# Patient Record
Sex: FEMALE | Race: WHITE | NOT HISPANIC OR LATINO
[De-identification: names, ages, dates, MRNs, and addresses within clinical notes are randomized per-mention and may not be internally consistent; named-entity substitution may affect disease eponyms.]

---

## 2018-06-04 ENCOUNTER — TRANSCRIPTION ENCOUNTER (OUTPATIENT)
Age: 48
End: 2018-06-04

## 2019-05-14 ENCOUNTER — TRANSCRIPTION ENCOUNTER (OUTPATIENT)
Age: 49
End: 2019-05-14

## 2019-06-14 ENCOUNTER — TRANSCRIPTION ENCOUNTER (OUTPATIENT)
Age: 49
End: 2019-06-14

## 2019-12-22 ENCOUNTER — TRANSCRIPTION ENCOUNTER (OUTPATIENT)
Age: 49
End: 2019-12-22

## 2023-09-05 ENCOUNTER — NON-APPOINTMENT (OUTPATIENT)
Age: 53
End: 2023-09-05

## 2023-09-06 ENCOUNTER — APPOINTMENT (OUTPATIENT)
Dept: DERMATOLOGY | Facility: CLINIC | Age: 53
End: 2023-09-06

## 2024-04-27 ENCOUNTER — NON-APPOINTMENT (OUTPATIENT)
Age: 54
End: 2024-04-27

## 2024-04-30 ENCOUNTER — APPOINTMENT (OUTPATIENT)
Dept: ORTHOPEDIC SURGERY | Facility: CLINIC | Age: 54
End: 2024-04-30
Payer: COMMERCIAL

## 2024-04-30 VITALS
BODY MASS INDEX: 23.32 KG/M2 | HEIGHT: 65 IN | DIASTOLIC BLOOD PRESSURE: 92 MMHG | WEIGHT: 140 LBS | SYSTOLIC BLOOD PRESSURE: 148 MMHG | HEART RATE: 60 BPM

## 2024-04-30 DIAGNOSIS — Z86.39 PERSONAL HISTORY OF OTHER ENDOCRINE, NUTRITIONAL AND METABOLIC DISEASE: ICD-10-CM

## 2024-04-30 DIAGNOSIS — Z78.9 OTHER SPECIFIED HEALTH STATUS: ICD-10-CM

## 2024-04-30 DIAGNOSIS — M25.512 PAIN IN LEFT SHOULDER: ICD-10-CM

## 2024-04-30 DIAGNOSIS — M89.8X1 OTHER SPECIFIED DISORDERS OF BONE, SHOULDER: ICD-10-CM

## 2024-04-30 DIAGNOSIS — M54.12 RADICULOPATHY, CERVICAL REGION: ICD-10-CM

## 2024-04-30 PROBLEM — Z00.00 ENCOUNTER FOR PREVENTIVE HEALTH EXAMINATION: Status: ACTIVE | Noted: 2024-04-30

## 2024-04-30 PROCEDURE — 73030 X-RAY EXAM OF SHOULDER: CPT | Mod: LT

## 2024-04-30 PROCEDURE — 99204 OFFICE O/P NEW MOD 45 MIN: CPT

## 2024-04-30 PROCEDURE — 72040 X-RAY EXAM NECK SPINE 2-3 VW: CPT

## 2024-04-30 RX ORDER — METHYLPREDNISOLONE 4 MG/1
4 TABLET ORAL
Qty: 1 | Refills: 1 | Status: ACTIVE | COMMUNITY
Start: 2024-04-30 | End: 1900-01-01

## 2024-04-30 RX ORDER — METHOCARBAMOL 750 MG/1
750 TABLET, FILM COATED ORAL 3 TIMES DAILY
Qty: 30 | Refills: 0 | Status: ACTIVE | COMMUNITY
Start: 2024-04-30 | End: 1900-01-01

## 2024-04-30 RX ORDER — NAPROXEN 500 MG/1
500 TABLET ORAL
Qty: 60 | Refills: 1 | Status: ACTIVE | COMMUNITY
Start: 2024-04-30 | End: 1900-01-01

## 2024-04-30 NOTE — PHYSICAL EXAM
[Normal Finger/nose] : finger to nose coordination [Normal] : no peripheral adenopathy appreciated [de-identified] : Left Shoulder Exam  Inspection: No malalignment, no defects Skin: No masses, no lesions, skin intact Neck: Negative Spurlings, full ROM without pain ROM: Full active ROM of the RIGHT shoulder without pain, Full active ROM of the LEFT shoulder with mild pain in the posterior shoulder with forward flexion Full neck ROM with no deficits.  Patient with some periscapular pain with right head tilt Tenderness: No bicipital tenderness, no tenderness to the greater tuberosity/RTC insertion, no anterior shoulder/lesser tuberosity tenderness, significant tenderness to the periscapular musculature with multiple muscle spasms palpated medially to the scapula Strength: 5/5 strength against resistance with ER, IR, and Supraspinatus testing.  Bilateral upper extremity strength 5/5,  strength 5/5, equal bilaterally AC Joint: No TTP, no pain with cross arm testing. Biceps: Speed negative, [place] negative Impingement Test: Negative [place] Empty Can Test: Negative Stability: Negative Apprehension, negative anterior/posterior load and shift Vascular: 2+ radial pulse Neuro: AIN/PIN/Ulnar nn. intact to motor Sensation: Grossly intact without deficits.  Equal bilaterally [de-identified] : GINAR [de-identified] : 3 xray views of the left shoulder were obtained.  -No fractures or dislocations  2 x-ray views of the cervical spine were obtained.  -No fractures or dislocations -Significant straightening of the normal cervical lordosis -Mild degenerative changes noted caudally

## 2024-04-30 NOTE — ASSESSMENT
[FreeTextEntry1] : Patient presents to the office today with left shoulder pain. Symptoms are consistent with cervical radiculopathy and periscapular pain. The nature of this condition was described at length with the patient and they verbalized understanding.   Recommendations: -Physical therapy -Methocarbamol (Rx sent) -Medrol Dosepak (Rx sent) -Naproxen (Rx sent) -We discussed at length the pathophysiology of her diagnosis.  The patient verbalized understanding.  I stressed the importance of the physical therapy in the recovery from her current symptoms.   -Followup with Dr. Will or Dr. Shelley as needed -Patient was given ample time to ask questions and all questions were answered to the patient's full satisfaction.   The patient was in full agreement with this plan and appreciative of their care.

## 2024-04-30 NOTE — HISTORY OF PRESENT ILLNESS
[FreeTextEntry1] : 04/30/2024: The patient is a 54-year-old, right-hand dominant female who presents to the office today for initial evaluation of left shoulder pain.  The shoulder pain has been present for the past 5 days.  It is located in the posterior aspect of the shoulder.  It radiates down the arm to the dorsal forearm.  Pain is worse in the morning and is constant throughout the day. Severity waxes and wanes. The patient denies any injury or inciting event, rather she states that she woke up with the shoulder pain.  Exacerbating factors include certain positions.  Alleviating factors include soaking in Epsom salt.  She does endorse neck pain for the same amount of time.  The patient also denies any left upper extremity paresthesias, but does endorse radicular symptoms.  No bleeding, fever, chills, sweats, nausea or vomiting were endorsed at this visit. The above history is in addition to the intake form, which I personally reviewed at length, including the patient's medical, surgical, and family history. The patient's allergies were also carefully reviewed. In addition, the family and social history of the patient were also reviewed, which are non-contributory to this visit unless specified above. All of this has been documented accordingly in the visit note.

## 2024-05-14 PROBLEM — M54.12 CERVICAL RADICULOPATHY: Status: ACTIVE | Noted: 2024-04-30

## 2024-05-14 PROBLEM — M25.512 LEFT SHOULDER PAIN: Status: ACTIVE | Noted: 2024-04-30

## 2025-01-29 ENCOUNTER — NON-APPOINTMENT (OUTPATIENT)
Age: 55
End: 2025-01-29

## 2025-01-30 ENCOUNTER — APPOINTMENT (OUTPATIENT)
Dept: DERMATOLOGY | Facility: CLINIC | Age: 55
End: 2025-01-30
Payer: COMMERCIAL

## 2025-01-30 PROCEDURE — 99203 OFFICE O/P NEW LOW 30 MIN: CPT

## 2025-06-14 ENCOUNTER — NON-APPOINTMENT (OUTPATIENT)
Age: 55
End: 2025-06-14

## 2025-06-22 ENCOUNTER — NON-APPOINTMENT (OUTPATIENT)
Age: 55
End: 2025-06-22